# Patient Record
Sex: MALE | Race: WHITE
[De-identification: names, ages, dates, MRNs, and addresses within clinical notes are randomized per-mention and may not be internally consistent; named-entity substitution may affect disease eponyms.]

---

## 2019-10-03 ENCOUNTER — HOSPITAL ENCOUNTER (OUTPATIENT)
Dept: HOSPITAL 84 - D.MAMMO | Age: 54
Discharge: HOME | End: 2019-10-03
Attending: FAMILY MEDICINE
Payer: COMMERCIAL

## 2019-10-03 VITALS — BODY MASS INDEX: 29.9 KG/M2

## 2019-10-03 DIAGNOSIS — N63.41: Primary | ICD-10-CM

## 2020-10-07 ENCOUNTER — HOSPITAL ENCOUNTER (OUTPATIENT)
Dept: HOSPITAL 84 - D.HCCARDIO | Age: 55
Discharge: HOME | End: 2020-10-07
Attending: INTERNAL MEDICINE
Payer: COMMERCIAL

## 2020-10-07 VITALS — BODY MASS INDEX: 29.9 KG/M2

## 2020-10-07 DIAGNOSIS — I20.9: Primary | ICD-10-CM

## 2020-10-09 NOTE — EC
PATIENT:AMY AMBRIZ                 DATE OF SERVICE: 10/07/20
SEX: M                                  MEDICAL RECORD: [id number]
DATE OF BIRTH: [birth year]                        [facility]          
AGE OF PATIENT: 55                             ADMISSION DATE: 10/07/20
 
REFERRING PHYSICIAN:                               
 
INTERPRETING PHYSICIAN: NEEMA SANCHES MD          
 
 
 
                             ECHOCARDIOGRAM REPORT
  ECHO CHARGES 4               ECHO COMPLETE                 Date: 10/07/20
 
 
 
CLINICAL DIAGNOSIS: ANGINA/HEART MURMUR           
 
                         ECHOCARDIOGRAPHIC MEASUREMENTS
      (adult normal given)
   AC root (d.<3.7cm) 3.5  cm   LV Septum d (<1.2 cm> 1.3  cm
      Valve Excursion 2.1  cm     LV Septum (systole) 1.4  cm
Left Atria (s.<4.0cm> 3.5  cm          LVPW d(<1.2cm) 1.4  cm
        RV (d.<2.3cm) 3.7  cm           LVPW (sytole) 1.7  cm
  LV diastole(<5.6CM) 4.4  cm       MV E-F(>70mm/sec)      cm
           LV systole 3.1  cm           LVOT Diameter 2.0  cm
       MV exc.(>10mm) 1.6  cm
Est.ejection fraction (50-75%)     %
 
   DOPPLER:
     LVIT      cm/sec A 64.0 cm/sec E 76.0  cm/sec
       LA      cm/sec      RVSP 33   mmHg
     LVOT 99   cm/sec   AOP1/2T      m/s
  Asc. Ao 123  cm/sec
     RVOT 91   cm/sec
       RA      cm/sec
       PA 99   cm/sec
 AV Gradient Peak 6.10 mmHg  AV Mean 3.47 mmHg  AV Area 2.3  cm
 MV Gradient Peak 2.98 mmHg  MV Mean 1.16 mmHg  MV Area      cm
   COMMENTS:                                              
 
 
 Cardiac Sonographer: 2               JESSIKA JONES              
      Cardiologist: 2          Dr. Calderon                
             TAPE# PACS           
                                       Pericardial Effusion N                        
 
 
DATE OF SERVICE:  
 
Adequate 2D, color flow imaging, spectral Doppler, and M-mode.
 
LVH is present.  LV internal dimension is normal.  Wall motion is normal.  EF is
greater than or equal to 55%.  Aortic valve is tricuspid.  No evidence of
stenosis by Doppler interrogation. Left atrium appeared normal at 3.5 cm. 
Mitral valve shows no prolapse.  Trace MR. Right-sided chambers are grossly
normal.  Trace TR.
 
 
 
 
ECHOCARDIOGRAM REPORT                          [id number]    AMY AMBRIZ         
 
 
NTS:[id number] Voice Confirmation ID: [id number] DOCUMENT ID: [id number]
                                           
                                           NEEMA SANCHES MD          
 
 
 
Electronically Signed by NEEMA SANCHES on 10/09/20 at 0855
 
 
 
 
 
 
 
 
 
 
 
 
 
 
 
 
 
 
 
 
 
 
 
 
 
 
 
 
 
 
 
 
 
 
 
 
 
 
 
CC:                                                             [id number]
DICTATION DATE: 10/08/20 1423     :     10/08/20 2133      DEP CLI 
                                                                      10/07/20
[place] [id number] [place] [phone]